# Patient Record
Sex: FEMALE | Race: BLACK OR AFRICAN AMERICAN
[De-identification: names, ages, dates, MRNs, and addresses within clinical notes are randomized per-mention and may not be internally consistent; named-entity substitution may affect disease eponyms.]

---

## 2019-02-24 ENCOUNTER — HOSPITAL ENCOUNTER (INPATIENT)
Dept: HOSPITAL 87 - ER | Age: 54
LOS: 2 days | Discharge: HOME | DRG: 133 | End: 2019-02-26
Attending: INTERNAL MEDICINE | Admitting: INTERNAL MEDICINE
Payer: MEDICAID

## 2019-02-24 VITALS — HEIGHT: 66 IN | BODY MASS INDEX: 32.84 KG/M2 | WEIGHT: 204.37 LBS

## 2019-02-24 DIAGNOSIS — J20.9: ICD-10-CM

## 2019-02-24 DIAGNOSIS — J44.1: ICD-10-CM

## 2019-02-24 DIAGNOSIS — J96.00: Primary | ICD-10-CM

## 2019-02-24 DIAGNOSIS — E78.5: ICD-10-CM

## 2019-02-24 DIAGNOSIS — Z90.710: ICD-10-CM

## 2019-02-24 DIAGNOSIS — J44.0: ICD-10-CM

## 2019-02-24 DIAGNOSIS — F17.210: ICD-10-CM

## 2019-02-24 DIAGNOSIS — J18.9: ICD-10-CM

## 2019-02-24 DIAGNOSIS — R65.10: ICD-10-CM

## 2019-02-24 DIAGNOSIS — Z71.6: ICD-10-CM

## 2019-02-24 LAB
BASOPHILS NFR BLD AUTO: 0.7 % (ref 0–2)
CHLORIDE SERPL-SCNC: 109 MEQ/L (ref 98–107)
EOSINOPHIL NFR BLD AUTO: 2.6 % (ref 0–5)
ERYTHROCYTE [DISTWIDTH] IN BLOOD BY AUTOMATED COUNT: 14.4 % (ref 11.6–14.6)
HCT VFR BLD AUTO: 39 % (ref 36–48)
HGB BLD-MCNC: 13.1 G/DL (ref 12–16)
LYMPHOCYTES NFR BLD AUTO: 57.9 % (ref 20–50)
MCH RBC QN AUTO: 32.2 PG (ref 28–32)
MCV RBC AUTO: 95.5 FL (ref 81–99)
MONOCYTES NFR BLD AUTO: 7.4 % (ref 2–8)
NEUTROPHILS NFR BLD AUTO: 31.4 % (ref 40–76)
PLATELET # BLD AUTO: 302 X1000/UL (ref 130–400)
PMV BLD AUTO: 8.4 FL (ref 7.4–10.4)
RBC # BLD AUTO: 4.08 MILL/UL (ref 4.2–5.4)

## 2019-02-24 PROCEDURE — 94640 AIRWAY INHALATION TREATMENT: CPT

## 2019-02-24 PROCEDURE — 36600 WITHDRAWAL OF ARTERIAL BLOOD: CPT

## 2019-02-24 PROCEDURE — 83036 HEMOGLOBIN GLYCOSYLATED A1C: CPT

## 2019-02-24 PROCEDURE — 83880 ASSAY OF NATRIURETIC PEPTIDE: CPT

## 2019-02-24 PROCEDURE — 82805 BLOOD GASES W/O2 SATURATION: CPT

## 2019-02-24 PROCEDURE — 80305 DRUG TEST PRSMV DIR OPT OBS: CPT

## 2019-02-24 PROCEDURE — 80061 LIPID PANEL: CPT

## 2019-02-24 PROCEDURE — 96375 TX/PRO/DX INJ NEW DRUG ADDON: CPT

## 2019-02-24 PROCEDURE — 84484 ASSAY OF TROPONIN QUANT: CPT

## 2019-02-24 PROCEDURE — 87804 INFLUENZA ASSAY W/OPTIC: CPT

## 2019-02-24 PROCEDURE — 93005 ELECTROCARDIOGRAM TRACING: CPT

## 2019-02-24 PROCEDURE — 36415 COLL VENOUS BLD VENIPUNCTURE: CPT

## 2019-02-24 PROCEDURE — 99285 EMERGENCY DEPT VISIT HI MDM: CPT

## 2019-02-24 PROCEDURE — 96374 THER/PROPH/DIAG INJ IV PUSH: CPT

## 2019-02-24 PROCEDURE — 71045 X-RAY EXAM CHEST 1 VIEW: CPT

## 2019-02-24 PROCEDURE — 82375 ASSAY CARBOXYHB QUANT: CPT

## 2019-02-24 PROCEDURE — 93970 EXTREMITY STUDY: CPT

## 2019-02-25 VITALS — DIASTOLIC BLOOD PRESSURE: 85 MMHG | SYSTOLIC BLOOD PRESSURE: 143 MMHG

## 2019-02-25 VITALS — DIASTOLIC BLOOD PRESSURE: 83 MMHG | SYSTOLIC BLOOD PRESSURE: 133 MMHG

## 2019-02-25 LAB
BASE EXCESS BLDA CALC-SCNC: -1.4 MMOL/L (ref -2–2)
COHGB MFR BLDA: 3.9 % (ref 0.5–1.5)
DO-HGB MFR BLDA: 8.3 % (ref 0–5)
HCO3 BLDA-SCNC: 23.8 MMOL/L (ref 22–26)
HDLC SERPL-MCNC: 53 MG/DL (ref 40–59)
HGB BLDA OXIMETRY-MCNC: 13.5 G/DL (ref 12–18)
INHALED O2 CONCENTRATION: 21 %
LDLC SERPL DIRECT ASSAY-MCNC: 148 MG/DL (ref 5–100)
METHGB MFR BLDA: 0.3 % (ref 0–1.5)
OXYHGB MFR BLDA: 87.5 % (ref 94–97)
PCO2 TEMP ADJ BLDA: 42 MMHG (ref 35–45)
PH TEMP ADJ BLDA: 7.37 [PH] (ref 7.35–7.45)
PO2 TEMP ADJ BLDA: 61.1 MMHG (ref 75–100)
SAO2 % BLDA: 91.3 % (ref 92–98.5)
SPECIMEN DRAWN FROM PATIENT: (no result)
VENTILATION MODE VENT: (no result)

## 2019-02-25 RX ADMIN — METHYLPREDNISOLONE SODIUM SUCCINATE SCH MG: 125 INJECTION, POWDER, FOR SOLUTION INTRAMUSCULAR; INTRAVENOUS at 18:21

## 2019-02-25 RX ADMIN — FLUTICASONE PROPIONATE SCH SPRAY: 50 SPRAY, METERED NASAL at 21:00

## 2019-02-25 RX ADMIN — GUAIFENESIN AND DEXTROMETHORPHAN HYDROBROMIDE SCH TAB: 600; 30 TABLET, EXTENDED RELEASE ORAL at 20:32

## 2019-02-25 RX ADMIN — AZITHROMYCIN SCH MG: 500 TABLET, FILM COATED ORAL at 20:31

## 2019-02-25 RX ADMIN — IPRATROPIUM BROMIDE AND ALBUTEROL SULFATE SCH ML: .5; 3 SOLUTION RESPIRATORY (INHALATION) at 14:03

## 2019-02-25 RX ADMIN — METHYLPREDNISOLONE SODIUM SUCCINATE SCH MG: 125 INJECTION, POWDER, FOR SOLUTION INTRAMUSCULAR; INTRAVENOUS at 10:30

## 2019-02-25 RX ADMIN — DILTIAZEM HYDROCHLORIDE SCH MG: 60 TABLET, FILM COATED ORAL at 18:22

## 2019-02-25 RX ADMIN — FAMOTIDINE SCH MG: 20 TABLET ORAL at 20:32

## 2019-02-25 RX ADMIN — LORATADINE SCH MG: 10 TABLET ORAL at 20:32

## 2019-02-26 VITALS — DIASTOLIC BLOOD PRESSURE: 78 MMHG | SYSTOLIC BLOOD PRESSURE: 135 MMHG

## 2019-02-26 VITALS — DIASTOLIC BLOOD PRESSURE: 79 MMHG | SYSTOLIC BLOOD PRESSURE: 130 MMHG

## 2019-02-26 VITALS — SYSTOLIC BLOOD PRESSURE: 111 MMHG | DIASTOLIC BLOOD PRESSURE: 81 MMHG

## 2019-02-26 VITALS — DIASTOLIC BLOOD PRESSURE: 79 MMHG | SYSTOLIC BLOOD PRESSURE: 129 MMHG

## 2019-02-26 LAB
AMPHETAMINES UR QL SCN: NEGATIVE
APPEARANCE UR: CLEAR
BARBITURATES UR QL SCN: NEGATIVE
BENZODIAZ UR QL SCN: NEGATIVE
BZE UR QL SCN: NEGATIVE
CANNABINOIDS UR QL SCN: NEGATIVE
COLOR UR: YELLOW
HGB UR QL STRIP: NEGATIVE
KETONES UR STRIP-MCNC: NEGATIVE MG/DL
LEUKOCYTE ESTERASE UR QL STRIP: NEGATIVE
METHADONE UR QL SCN: NEGATIVE
NITRITE UR QL STRIP: NEGATIVE
OPIATES UR QL SCN: (no result)
PCP UR QL SCN: NEGATIVE
PH UR STRIP: 6.5 [PH] (ref 4.5–8)
PROT UR QL STRIP: NEGATIVE
SP GR UR STRIP: 1.02 (ref 1–1.03)
UROBILINOGEN UR STRIP-MCNC: 0.2 E.U./DL (ref 0.2–1)

## 2019-02-26 RX ADMIN — FLUTICASONE PROPIONATE SCH SPRAY: 50 SPRAY, METERED NASAL at 11:45

## 2019-02-26 RX ADMIN — FAMOTIDINE SCH MG: 20 TABLET ORAL at 08:37

## 2019-02-26 RX ADMIN — DILTIAZEM HYDROCHLORIDE SCH MG: 60 TABLET, FILM COATED ORAL at 11:46

## 2019-02-26 RX ADMIN — DILTIAZEM HYDROCHLORIDE SCH MG: 60 TABLET, FILM COATED ORAL at 00:32

## 2019-02-26 RX ADMIN — LORATADINE SCH MG: 10 TABLET ORAL at 08:37

## 2019-02-26 RX ADMIN — IPRATROPIUM BROMIDE AND ALBUTEROL SULFATE SCH ML: .5; 3 SOLUTION RESPIRATORY (INHALATION) at 08:27

## 2019-02-26 RX ADMIN — IPRATROPIUM BROMIDE AND ALBUTEROL SULFATE SCH ML: .5; 3 SOLUTION RESPIRATORY (INHALATION) at 01:08

## 2019-02-26 RX ADMIN — Medication SCH MG: at 13:51

## 2019-02-26 RX ADMIN — GUAIFENESIN AND DEXTROMETHORPHAN HYDROBROMIDE SCH TAB: 600; 30 TABLET, EXTENDED RELEASE ORAL at 08:37

## 2019-02-26 RX ADMIN — DILTIAZEM HYDROCHLORIDE SCH MG: 60 TABLET, FILM COATED ORAL at 05:30

## 2019-02-26 RX ADMIN — IPRATROPIUM BROMIDE AND ALBUTEROL SULFATE SCH ML: .5; 3 SOLUTION RESPIRATORY (INHALATION) at 11:37

## 2019-02-26 RX ADMIN — IPRATROPIUM BROMIDE AND ALBUTEROL SULFATE SCH ML: .5; 3 SOLUTION RESPIRATORY (INHALATION) at 05:00

## 2019-02-26 RX ADMIN — Medication SCH MG: at 05:30

## 2019-02-26 RX ADMIN — AZITHROMYCIN SCH MG: 500 TABLET, FILM COATED ORAL at 08:37

## 2019-05-23 ENCOUNTER — HOSPITAL ENCOUNTER (EMERGENCY)
Dept: HOSPITAL 87 - ER | Age: 54
Discharge: HOME | End: 2019-05-23
Payer: MEDICAID

## 2019-05-23 VITALS — WEIGHT: 205.03 LBS | BODY MASS INDEX: 35 KG/M2 | HEIGHT: 64 IN

## 2019-05-23 VITALS — SYSTOLIC BLOOD PRESSURE: 129 MMHG | DIASTOLIC BLOOD PRESSURE: 81 MMHG

## 2019-05-23 DIAGNOSIS — S90.32XA: Primary | ICD-10-CM
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
## 2019-05-23 DIAGNOSIS — M25.561: ICD-10-CM
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
## 2019-05-23 PROCEDURE — 73560 X-RAY EXAM OF KNEE 1 OR 2: CPT

## 2019-05-23 PROCEDURE — 99283 EMERGENCY DEPT VISIT LOW MDM: CPT

## 2019-05-23 PROCEDURE — 73630 X-RAY EXAM OF FOOT: CPT

## 2019-06-12 ENCOUNTER — HOSPITAL ENCOUNTER (INPATIENT)
Dept: HOSPITAL 87 - ER | Age: 54
LOS: 2 days | Discharge: HOME | DRG: 140 | End: 2019-06-14
Attending: INTERNAL MEDICINE | Admitting: INTERNAL MEDICINE
Payer: MEDICAID

## 2019-06-12 VITALS — WEIGHT: 196 LBS | HEIGHT: 66 IN | BODY MASS INDEX: 31.5 KG/M2

## 2019-06-12 DIAGNOSIS — E66.9: ICD-10-CM

## 2019-06-12 DIAGNOSIS — Z71.3: ICD-10-CM

## 2019-06-12 DIAGNOSIS — E87.8: ICD-10-CM

## 2019-06-12 DIAGNOSIS — Z85.41: ICD-10-CM

## 2019-06-12 DIAGNOSIS — Z71.6: ICD-10-CM

## 2019-06-12 DIAGNOSIS — Z90.710: ICD-10-CM

## 2019-06-12 DIAGNOSIS — F17.210: ICD-10-CM

## 2019-06-12 DIAGNOSIS — J44.1: Primary | ICD-10-CM

## 2019-06-12 DIAGNOSIS — G90.8: ICD-10-CM

## 2019-06-12 LAB
BASOPHILS NFR BLD AUTO: 0.3 % (ref 0–2)
CHLORIDE SERPL-SCNC: 109 MEQ/L (ref 98–107)
EOSINOPHIL NFR BLD AUTO: 1.3 % (ref 0–5)
ERYTHROCYTE [DISTWIDTH] IN BLOOD BY AUTOMATED COUNT: 14.2 % (ref 11.6–14.6)
HCT VFR BLD AUTO: 37.2 % (ref 36–48)
HGB BLD-MCNC: 12.7 G/DL (ref 12–16)
LYMPHOCYTES NFR BLD AUTO: 41 % (ref 20–50)
MCH RBC QN AUTO: 32.7 PG (ref 28–32)
MCV RBC AUTO: 95.7 FL (ref 81–99)
MONOCYTES NFR BLD AUTO: 8.3 % (ref 2–8)
NEUTROPHILS NFR BLD AUTO: 49.1 % (ref 40–76)
PLATELET # BLD AUTO: 276 X1000/UL (ref 130–400)
PMV BLD AUTO: 7.9 FL (ref 7.4–10.4)
RBC # BLD AUTO: 3.89 MILL/UL (ref 4.2–5.4)

## 2019-06-12 PROCEDURE — 94640 AIRWAY INHALATION TREATMENT: CPT

## 2019-06-12 PROCEDURE — 96372 THER/PROPH/DIAG INJ SC/IM: CPT

## 2019-06-12 PROCEDURE — 96375 TX/PRO/DX INJ NEW DRUG ADDON: CPT

## 2019-06-12 PROCEDURE — 93005 ELECTROCARDIOGRAM TRACING: CPT

## 2019-06-12 PROCEDURE — 96365 THER/PROPH/DIAG IV INF INIT: CPT

## 2019-06-12 PROCEDURE — 99285 EMERGENCY DEPT VISIT HI MDM: CPT

## 2019-06-12 PROCEDURE — 84484 ASSAY OF TROPONIN QUANT: CPT

## 2019-06-12 PROCEDURE — 71045 X-RAY EXAM CHEST 1 VIEW: CPT

## 2019-06-12 PROCEDURE — 80305 DRUG TEST PRSMV DIR OPT OBS: CPT

## 2019-06-12 PROCEDURE — 83880 ASSAY OF NATRIURETIC PEPTIDE: CPT

## 2019-06-12 PROCEDURE — 96367 TX/PROPH/DG ADDL SEQ IV INF: CPT

## 2019-06-12 PROCEDURE — 36415 COLL VENOUS BLD VENIPUNCTURE: CPT

## 2019-06-13 VITALS — DIASTOLIC BLOOD PRESSURE: 67 MMHG | SYSTOLIC BLOOD PRESSURE: 124 MMHG

## 2019-06-13 VITALS — DIASTOLIC BLOOD PRESSURE: 89 MMHG | SYSTOLIC BLOOD PRESSURE: 140 MMHG

## 2019-06-13 VITALS — DIASTOLIC BLOOD PRESSURE: 63 MMHG | SYSTOLIC BLOOD PRESSURE: 91 MMHG

## 2019-06-13 VITALS — DIASTOLIC BLOOD PRESSURE: 84 MMHG | SYSTOLIC BLOOD PRESSURE: 124 MMHG

## 2019-06-13 VITALS — SYSTOLIC BLOOD PRESSURE: 124 MMHG | DIASTOLIC BLOOD PRESSURE: 67 MMHG

## 2019-06-13 VITALS — DIASTOLIC BLOOD PRESSURE: 101 MMHG | SYSTOLIC BLOOD PRESSURE: 123 MMHG

## 2019-06-13 VITALS — DIASTOLIC BLOOD PRESSURE: 79 MMHG | SYSTOLIC BLOOD PRESSURE: 137 MMHG

## 2019-06-13 RX ADMIN — Medication SCH MG: at 23:17

## 2019-06-13 RX ADMIN — LACTULOSE SCH ML: 10 SOLUTION ORAL at 22:26

## 2019-06-13 RX ADMIN — NICOTINE SCH MG: 21 PATCH, EXTENDED RELEASE TRANSDERMAL at 00:13

## 2019-06-13 RX ADMIN — ENOXAPARIN SODIUM SCH MG: 100 INJECTION SUBCUTANEOUS at 02:05

## 2019-06-13 RX ADMIN — IPRATROPIUM BROMIDE AND ALBUTEROL SULFATE SCH ML: .5; 3 SOLUTION RESPIRATORY (INHALATION) at 21:12

## 2019-06-13 RX ADMIN — LACTULOSE SCH ML: 10 SOLUTION ORAL at 14:48

## 2019-06-13 RX ADMIN — Medication SCH MG: at 00:12

## 2019-06-13 RX ADMIN — Medication SCH MG: at 14:48

## 2019-06-13 RX ADMIN — ZOLPIDEM TARTRATE PRN MG: 5 TABLET ORAL at 00:21

## 2019-06-13 RX ADMIN — ZOLPIDEM TARTRATE PRN MG: 5 TABLET ORAL at 23:17

## 2019-06-13 RX ADMIN — NICOTINE SCH MG: 21 PATCH, EXTENDED RELEASE TRANSDERMAL at 09:34

## 2019-06-13 RX ADMIN — FLUTICASONE PROPIONATE SCH SPRAY: 50 SPRAY, METERED NASAL at 21:13

## 2019-06-13 RX ADMIN — Medication SCH MG: at 06:57

## 2019-06-13 RX ADMIN — IPRATROPIUM BROMIDE AND ALBUTEROL SULFATE SCH ML: .5; 3 SOLUTION RESPIRATORY (INHALATION) at 08:00

## 2019-06-13 RX ADMIN — FLUTICASONE PROPIONATE SCH SPRAY: 50 SPRAY, METERED NASAL at 09:35

## 2019-06-13 RX ADMIN — IPRATROPIUM BROMIDE AND ALBUTEROL SULFATE SCH ML: .5; 3 SOLUTION RESPIRATORY (INHALATION) at 14:49

## 2019-06-13 RX ADMIN — GUAIFENESIN SCH MG: 600 TABLET, EXTENDED RELEASE ORAL at 21:13

## 2019-06-14 VITALS — DIASTOLIC BLOOD PRESSURE: 79 MMHG | SYSTOLIC BLOOD PRESSURE: 161 MMHG

## 2019-06-14 VITALS — DIASTOLIC BLOOD PRESSURE: 75 MMHG | SYSTOLIC BLOOD PRESSURE: 123 MMHG

## 2019-06-14 VITALS — SYSTOLIC BLOOD PRESSURE: 121 MMHG | DIASTOLIC BLOOD PRESSURE: 83 MMHG

## 2019-06-14 VITALS — SYSTOLIC BLOOD PRESSURE: 130 MMHG | DIASTOLIC BLOOD PRESSURE: 85 MMHG

## 2019-06-14 VITALS — SYSTOLIC BLOOD PRESSURE: 116 MMHG | DIASTOLIC BLOOD PRESSURE: 79 MMHG

## 2019-06-14 LAB
AMPHETAMINES UR QL SCN: NEGATIVE
BARBITURATES UR QL SCN: NEGATIVE
BENZODIAZ UR QL SCN: NEGATIVE
BZE UR QL SCN: NEGATIVE
CANNABINOIDS UR QL SCN: NEGATIVE
METHADONE UR QL SCN: NEGATIVE
OPIATES UR QL SCN: NEGATIVE
PCP UR QL SCN: NEGATIVE

## 2019-06-14 RX ADMIN — Medication SCH MG: at 06:50

## 2019-06-14 RX ADMIN — GUAIFENESIN SCH MG: 600 TABLET, EXTENDED RELEASE ORAL at 08:54

## 2019-06-14 RX ADMIN — NICOTINE SCH MG: 21 PATCH, EXTENDED RELEASE TRANSDERMAL at 08:54

## 2019-06-14 RX ADMIN — IPRATROPIUM BROMIDE AND ALBUTEROL SULFATE SCH ML: .5; 3 SOLUTION RESPIRATORY (INHALATION) at 00:31

## 2019-06-14 RX ADMIN — IPRATROPIUM BROMIDE AND ALBUTEROL SULFATE SCH ML: .5; 3 SOLUTION RESPIRATORY (INHALATION) at 08:02

## 2019-06-14 RX ADMIN — FLUTICASONE PROPIONATE SCH SPRAY: 50 SPRAY, METERED NASAL at 08:53

## 2019-06-14 RX ADMIN — ENOXAPARIN SODIUM SCH MG: 100 INJECTION SUBCUTANEOUS at 08:53

## 2019-06-14 RX ADMIN — IPRATROPIUM BROMIDE AND ALBUTEROL SULFATE SCH ML: .5; 3 SOLUTION RESPIRATORY (INHALATION) at 04:39

## 2019-06-14 RX ADMIN — LACTULOSE SCH ML: 10 SOLUTION ORAL at 06:19

## 2019-11-18 ENCOUNTER — HOSPITAL ENCOUNTER (EMERGENCY)
Dept: HOSPITAL 72 - EMR | Age: 54
Discharge: HOME | End: 2019-11-18
Payer: COMMERCIAL

## 2019-11-18 VITALS — DIASTOLIC BLOOD PRESSURE: 90 MMHG | SYSTOLIC BLOOD PRESSURE: 132 MMHG

## 2019-11-18 VITALS — BODY MASS INDEX: 32.49 KG/M2 | HEIGHT: 65 IN | WEIGHT: 195 LBS

## 2019-11-18 VITALS — DIASTOLIC BLOOD PRESSURE: 78 MMHG | SYSTOLIC BLOOD PRESSURE: 128 MMHG

## 2019-11-18 DIAGNOSIS — S70.02XA: ICD-10-CM

## 2019-11-18 DIAGNOSIS — I10: ICD-10-CM
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
## 2019-11-18 DIAGNOSIS — J44.9: ICD-10-CM

## 2019-11-18 DIAGNOSIS — S40.012A: ICD-10-CM

## 2019-11-18 DIAGNOSIS — Z90.710: ICD-10-CM

## 2019-11-18 DIAGNOSIS — S46.912A: Primary | ICD-10-CM
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
## 2019-11-18 PROCEDURE — 73510: CPT

## 2019-11-18 PROCEDURE — 99284 EMERGENCY DEPT VISIT MOD MDM: CPT

## 2019-11-18 PROCEDURE — 72192 CT PELVIS W/O DYE: CPT

## 2019-11-18 PROCEDURE — 73030 X-RAY EXAM OF SHOULDER: CPT

## 2019-11-18 PROCEDURE — 73502 X-RAY EXAM HIP UNI 2-3 VIEWS: CPT

## 2019-11-18 NOTE — DIAGNOSTIC IMAGING REPORT
Indication: Left hip pain

 

Technique: 2 views of the left hip

 

Comparison: none

 

Findings: There is slight irregularity of the superior acetabulum on the left. No

acute hip fracture demonstrated.

 

Impression: Possible left acetabular fracture. Consider cross-sectional imaging for

further evaluation

## 2019-11-18 NOTE — EMERGENCY ROOM REPORT
History of Present Illness


General


Chief Complaint:  Upper Extremity Injury


Source:  Patient





Present Illness


HPI


54-year-old female presents to the emergency department complaining of 7 out of 

10 severity pain to the left shoulder and left posterior hip status post 

mechanical fall down some stairs on Saturday.  Patient reports she has 

attempted to relieve her pain by taking Motrin and Epson salt baths.  Patient 

reports despite these efforts she has not been able to experience any pain 

relief.  She denies hitting her head or having a loss of consciousness.  She 

denies midline neck or back pain.  Denies numbness tingling or loss of 

sensation or gross motor movements of the extremities, incontinence of bowel or 

bladder. Denies CP, Palpitations, LOC, AMS, dizziness, Changes in Vision, 

weakness or a sudden severe headache. Pmhx of COPD. Denies SOB or dyspnea.


Allergies:  


Coded Allergies:  


     No Known Allergies (Unverified , 10/14/12)





Patient History


Past Medical History:  see triage record, COPD


Past Surgical History:  hysterectomy


Pertinent Family History:  none


Last Menstrual Period:  HYSTERECTOMY


Pregnant Now:  No


Reviewed Nursing Documentation:  PMH: Agreed; PSxH: Agreed





Nursing Documentation-PMH


Past Medical History:  No History, Except For


Hx Cardiac Problems:  No


Hx Hypertension:  Yes


Hx Pacemaker:  No


Hx Asthma:  Yes


Hx COPD:  Yes - BRONCHITIS


Hx Diabetes:  No


Hx Cancer:  No


Hx Gastrointestinal Problems:  No


Hx Dialysis:  No


Hx Neurological Problems:  No


Hx Cerebrovascular Accident:  No


Hx Seizures:  No





Review of Systems


All Other Systems:  negative except mentioned in HPI





Physical Exam





Vital Signs








  Date Time  Temp Pulse Resp B/P (MAP) Pulse Ox O2 Delivery O2 Flow Rate FiO2


 


11/18/19 14:35 98.2 94 16 132/90 (104) 100 Room Air  








Sp02 EP Interpretation:  reviewed, normal


General Appearance:  no apparent distress, alert, GCS 15, non-toxic


Head:  normocephalic, atraumatic


Eyes:  bilateral eye normal inspection, bilateral eye PERRL


ENT:  hearing grossly normal, normal voice


Neck:  full range of motion, no bony tend


Respiratory:  chest non-tender, lungs clear, normal breath sounds, no rhonchi, 

no wheezing, speaking full sentences


Cardiovascular #1:  regular rate, rhythm, no edema, normal capillary refill


Musculoskeletal:  back normal, gait/station normal, normal range of motion, 

tender - lateral and posterior TTP to the left hip and gluteus, normal leg 

length bilaterally. ambulatory. TTP to the posterior and lateral aspect of the 

left shoulder, FROM with pain, no obvious step-off or clicking.


Neurologic:  alert, oriented x3, responsive, motor strength/tone normal, 

sensory intact, normal gait, speech normal, grossly normal


Psychiatric:  judgement/insight normal


Skin:  normal color





Medical Decision Making


PA Attestation


Dr. Freitas is my supervising Physician whom patient management has been 

discussed with.


Diagnostic Impression:  


 Primary Impression:  


 Contusion of hip, left


 Qualified Codes:  S70.02XA - Contusion of left hip, initial encounter


 Additional Impressions:  


 Left shoulder strain


 Qualified Codes:  S46.912A - Strain of unspecified muscle, fascia and tendon 

at shoulder and upper arm level, left arm, initial encounter


 Contusion of shoulder, left


 Qualified Codes:  S40.012A - Contusion of left shoulder, initial encounter


ER Course


54-year-old female presents to the emergency department complaining of 7 out of 

10 severity pain to the left shoulder and left posterior hip status post 

mechanical fall down some stairs on Saturday.  Patient reports she has 

attempted to relieve her pain by taking Motrin and Epson salt baths.  Patient 

reports despite these efforts she has not been able to experience any pain 

relief.  She denies hitting her head or having a loss of consciousness.  She 

denies midline neck or back pain.  Denies numbness tingling or loss of 

sensation or gross motor movements of the extremities, incontinence of bowel or 

bladder. Denies CP, Palpitations, LOC, AMS, dizziness, Changes in Vision, 

weakness or a sudden severe headache. Pmhx of COPD. Denies SOB or dyspnea.





Ddx considered but are not limited to Fracture, dislocation, contusion,  Sprain/

Strain/Spasm, spinal chord injury just to name a few.





Vital signs: are WNL, pt. is afebrile


H&PE are most consistent with musculoskeletal injury will perform imaging to r/

o fractures/dislocations. 





ORDERS:





-  X-ray Left Hip 3 views and Left Shoulder 3 Views   - negative for fx, 

Dislocation, or significant soft tissue injury, per preliminary read in ED, and 

signed by  JAZLYN Santo, my supervising physician has reviewed, and agrees 

with my interpretation.








--Radiologist reports questionable acetabular fracture, and recommend CT 

imaging for definitive diagnosis.





CT Pelvis: negative for acute fracture.





ED INTERVENTIONS:





-  Tylenol 1g


- Lidoderm tp.








DISCHARGE: At this time pt. is stable for d/c to home. Will provide printed 

patient care instructions, and any necessary prescriptions. Care plan and 

follow up instructions have been discussed with the patient prior to discharge.


Other X-Ray Diagnostic Results


Other X-Ray Diagnostic Results #1:  


   X-Ray ordered:  Left HIP


   # of Views/Limited Vs Complete:  3 View


   Indication:  Pain


   EP Interpretation:  Yes


   PA Xray:  Interpretation reviewed, by supervising MD, and agrees with 

findings.


   Interpretation:  no dislocation, no soft tissue swelling, no fractures


   Impression:  No acute disease


   Electronically Signed by:  Keke Santo PA-C


Other X-Ray Diagnostic Results #2:  


   X-Ray ordered:  Left Shoulder


   # of Views/Limited Vs Complete:  3 View


   Indication:  Pain


   EP Interpretation:  Yes


   PA Xray:  Interpretation reviewed, by supervising MD, and agrees with 

findings.


   Interpretation:  no dislocation, no soft tissue swelling, no fractures


   Impression:  No acute disease


   Electronically Signed by:  Keke Santo PA-C





CT/MRI/US Diagnostic Results


CT/MRI/US Diagnostic Results :  


   Imaging Test Ordered:  CT Pelvis  No-Contrast


   Impression


" NO acute fractures " per official radiology report- Please see report for 

specific details.





Last Vital Signs








  Date Time  Temp Pulse Resp B/P (MAP) Pulse Ox O2 Delivery O2 Flow Rate FiO2


 


11/18/19 14:40 98.2 80 16 132/90 100 Room Air  








Disposition:  HOME, SELF-CARE


Condition:  Stable


Scripts


Acetaminophen With Codeine (T#3) (TYLENOL #3 TAB*) Y Tab


1 TAB ORAL Q6H PRN for For Pain, #12 TAB


   Prov: Keke Santo         11/18/19


Referrals:  


H CLAUDE HUDSON,REFERRING (PCP)


Departure Forms:  Return to Work      Return to Work Date:  Nov 22, 2019


   Work Restrictions:  No Heavy Lifting, No Prolonged Standing


   Other Restrictions:  light duty.  May return Sooner if Symptoms have 

resolved. 


   Return to Full Activity:  Nov 29, 2019


Patient Instructions:  Contusion, Easy-to-Read





Additional Instructions:  


Take medications as directed. 


 ** Follow up with a Primary Care Provider in 3-5 days, even if your symptoms 

have resolved. ** 


--Please review list of primary care clinics, if you do not already have a 

primary care provider





Return sooner to ED if new symptoms occur, or current symptoms become worse. 


Do not drink alcohol, drive, or operate heavy machinery while taking [ ] as 

this may cause drowsiness. 











- Please note that this Emergency Department Report was dictated using [employer] technology software, occasionally this can lead to 

erroneous entry secondary to interpretation by the dictation equipment.











Keke Santo Nov 18, 2019 15:20

## 2019-11-18 NOTE — NUR
ED Nurse Note:



Patient walked in to ER from home due to Lt shoulder and Lt hip pain 7/10. per 
pt, she sliped and fell from the stairs on Saturday and pain got worse today. 
Patient alert and oriented x4 and ambulatory but limping. Calm and cooperative. 
Skin clean and intact. No cardiac or acute distress noted at this time.

## 2019-11-18 NOTE — NUR
ED Nurse Note:



Pt cleared by health care Provider for discharge. DC instructions/ electronic 
prescription was given and explained to pt and verbalized understanding of 
teachings. All medical deviecs such as ID band  removed. Pt is AAO x4, 
ambulatory and left with all personal belongings.

## 2019-11-18 NOTE — DIAGNOSTIC IMAGING REPORT
Indication: Left hip pain, trauma

 

Technique: Noncontrast spiral acquisitions obtained through the pelvis. Multiplanar

reconstructions generated. Total dose length product 1184 mGycm. CTDIvol(s) 31 mGy. 

Dose reduction achieved using automated exposure control

 

 

Comparison: none

 

Findings: No acute fracture demonstrated. Lucency reported on recent plain radiograph

appears to be a physiologic groove. The joint spaces are preserved. There are

degenerative changes of the lumbosacral junction incidentally noted.

 

There is a lucency within the left L4 lamina which measures 11 x 5 mm.

 

Uterus is absent. The included pelvic viscera are otherwise unremarkable. There is an

18 mm subcutaneous nodule seen in the inferomedial right buttock

 

Impression: No acute bony trauma

 

18 mm nodule in the right buttock within the subcutaneous fat adjacent to the dermis,

likely sebaceous or epidermal inclusion cyst or similar process. Correlate with

clinical findings

 

Prior history

 

The above findings are in agreement with the [facility] preliminary report

 

11 x 5 mm lucency within the left L4 lamina. Further evaluation with bone scan should

be considered to rule out metabolically active process. This finding was not reported

on the [facility] preliminary report, was described by phone with Dr. Chew at the

time of interpretation

 

 

 

The CT scanner at [facility] is accredited by the American College of

Radiology and the scans are performed using protocols designed to limit radiation

exposure to as low as reasonably achievable to attain images of sufficient resolution

adequate for diagnostic evaluation.

## 2019-11-18 NOTE — DIAGNOSTIC IMAGING REPORT
Indication: Left shoulder pain

 

Technique: 3 views of the left shoulder

 

Comparison: none

 

Findings: No acute fractures. No dislocations. The joint spaces are preserved. There

is an old healed fracture deformity of the posterior left fifth rib

 

Impression: No acute bony trauma

## 2020-01-07 ENCOUNTER — HOSPITAL ENCOUNTER (EMERGENCY)
Dept: HOSPITAL 72 - EMR | Age: 55
Discharge: HOME | End: 2020-01-07
Payer: COMMERCIAL

## 2020-01-07 VITALS — DIASTOLIC BLOOD PRESSURE: 77 MMHG | SYSTOLIC BLOOD PRESSURE: 130 MMHG

## 2020-01-07 VITALS — DIASTOLIC BLOOD PRESSURE: 79 MMHG | SYSTOLIC BLOOD PRESSURE: 132 MMHG

## 2020-01-07 VITALS — WEIGHT: 190 LBS | BODY MASS INDEX: 30.53 KG/M2 | HEIGHT: 66 IN

## 2020-01-07 DIAGNOSIS — Z85.41: ICD-10-CM

## 2020-01-07 DIAGNOSIS — F17.200: ICD-10-CM

## 2020-01-07 DIAGNOSIS — J44.1: Primary | ICD-10-CM

## 2020-01-07 DIAGNOSIS — I10: ICD-10-CM

## 2020-01-07 DIAGNOSIS — Z90.710: ICD-10-CM

## 2020-01-07 PROCEDURE — 99284 EMERGENCY DEPT VISIT MOD MDM: CPT

## 2020-01-07 NOTE — EMERGENCY ROOM REPORT
History of Present Illness


General


Chief Complaint:  Upper Respiratory Illness


Source:  Patient





Present Illness


HPI


54-year-old female presents to the emergency department complaining of 8/10 in 

severity cough, fevers and chills, body aches and mucus production x3 days.  

Patient reports her cough and wheezing has progressed over the last day as she 

is now out of her albuterol inhaler at home.  Patient reports that she is a 

daily smoker she has attempted to reduce her smoking frequency for the duration 

of the cold.  Patient states that she received the flu vaccination this year 

however every year she always gets a URI.  Patient denies recent travel or ill 

contacts.  Denies sore throat , ear pain CP, Palpitations, LOC, AMS, dizziness, 

Changes in Vision, Sensation, paresthesias, or a sudden severe headache.


Allergies:  


Coded Allergies:  


     No Known Allergies (Unverified , 10/14/12)





Patient History


Past Medical History:  see triage record


Past Surgical History:  none


Pertinent Family History:  none


Last Menstrual Period:  none-hyst


Pregnant Now:  No


Immunizations:  UTD


Reviewed Nursing Documentation:  PMH: Agreed; PSxH: Agreed





Nursing Documentation-PMH


Hx Cardiac Problems:  No


Hx Hypertension:  Yes


Hx Pacemaker:  No


Hx Asthma:  Yes


Hx COPD:  Yes - BRONCHITIS


Hx Diabetes:  No


Hx Cancer:  Yes - cervical


Hx Gastrointestinal Problems:  No


Hx Dialysis:  No


Hx Neurological Problems:  No


Hx Cerebrovascular Accident:  No


Hx Seizures:  No





Review of Systems


All Other Systems:  negative except mentioned in HPI





Physical Exam





Vital Signs








  Date Time  Temp Pulse Resp B/P (MAP) Pulse Ox O2 Delivery O2 Flow Rate FiO2


 


1/7/20 15:58 99.7 108 24 130/77 (94) 97 Room Air  








Sp02 EP Interpretation:  reviewed, normal


General Appearance:  no apparent distress, alert, GCS 15, non-toxic


Head:  normocephalic, atraumatic


Eyes:  bilateral eye normal inspection, bilateral eye PERRL


ENT:  hearing grossly normal, EOM grossly intact, normal pharynx, normal voice, 

TMs + canals normal, uvula midline, moist mucus membranes


Neck:  full range of motion


Respiratory:  chest non-tender, speaking full sentences, wheezing - expiratory


Cardiovascular #1:  regular rate, rhythm


Musculoskeletal:  normal range of motion, gait/station normal, non-tender


Neurologic:  alert, motor strength/tone normal, oriented x3, sensory intact, 

responsive, speech normal


Psychiatric:  judgement/insight normal


Lymphatic:  no adenopathy





Medical Decision Making


PA Attestation


Dr. Chew is my supervising Physician whom patient management has been 

discussed with.


Diagnostic Impression:  


 Primary Impression:  


 COPD exacerbation


ER Course


54-year-old female presents to the emergency department complaining of 8/10 in 

severity cough, fevers and chills, body aches and mucus production x3 days.  

Patient reports her cough and wheezing has progressed over the last day as she 

is now out of her albuterol inhaler at home.  Patient reports that she is a 

daily smoker she has attempted to reduce her smoking frequency for the duration 

of the cold.  Patient states that she received the flu vaccination this year 

however every year she always gets a URI.  Patient denies recent travel or ill 

contacts.  Denies sore throat , ear pain CP, Palpitations, LOC, AMS, dizziness, 

Changes in Vision, Sensation, paresthesias, or a sudden severe headache.





Ddx considered but are not limited to asthma exacerbation, CHF, URI, pneumonia, 

PE, strep pharyngitis, meningitis.





Vital signs: Pt. is afebrile,  VS are WNL


H&PE are most consistent with  COPD exacerbation secondary to URI





ORDERS: none required at this time, the diagnosis is clinical





ED INTERVENTIONS: 





Albuterol nebulized treatment. 


- re-examination post nebulized treatment lungs are CTA bilaterally. 





DISCHARGE: At this time pt. is stable for d/c to home. Will provide printed 

patient care instructions, and any necessary prescriptions. Care plan and 

follow up instructions have been discussed with the patient prior to discharge.





Last Vital Signs








  Date Time  Temp Pulse Resp B/P (MAP) Pulse Ox O2 Delivery O2 Flow Rate FiO2


 


1/7/20 16:17 99.7 104 24 130/77 97 Room Air  








Disposition:  HOME, SELF-CARE


Condition:  Stable


Scripts


Guaifenesin (Mucinex) 1,200 Mg Tab.er.12h


1200 MG PO Q12HR for 10 Days, #20 TAB


   Prov: Keke Santo         1/7/20 


Albuterol Sulfate* (ALBUTEROL SULFATE MDI*) 8.5 Gm Hfa.aer.ad


2 PUFF INH Q3H, #1 INH 0 Refills


   Prov: Keke Santo         1/7/20 


Levofloxacin* (LEVAQUIN*) 500 Mg Tablet


500 MG ORAL DAILY for 7 Days, #7 TAB


   Prov: Keke Santo         1/7/20 


Codeine/Promethazine Hcl* (PROMETHAZINE-CODEINE SYRUP*) 118 Ml Syrup


5 ML ORAL Q6H PRN for For Cough, #120 ML 0 Refills


   Prov: Keke Santo         1/7/20


Patient Instructions:  Upper Respiratory Infection, Adult





Additional Instructions:  


Take medications as directed. 


 ** Follow up with a Primary Care Provider in 3-5 days, even if your symptoms 

have resolved. ** 





Return sooner to ED if new symptoms occur, or current symptoms become worse. 


Do not drink alcohol, drive, or operate heavy machinery while taking Cough 

Syrup as this may cause drowsiness. 











- Please note that this Emergency Department Report was dictated using [employer] technology software, occasionally this can lead to 

erroneous entry secondary to interpretation by the dictation equipment.











Keke Santo Jan 7, 2020 17:28

## 2020-03-23 ENCOUNTER — HOSPITAL ENCOUNTER (EMERGENCY)
Dept: HOSPITAL 87 - ER | Age: 55
LOS: 1 days | Discharge: HOME | End: 2020-03-24
Payer: MEDICAID

## 2020-03-23 VITALS — HEIGHT: 65 IN | BODY MASS INDEX: 31.37 KG/M2 | WEIGHT: 188.27 LBS

## 2020-03-23 VITALS — SYSTOLIC BLOOD PRESSURE: 126 MMHG | DIASTOLIC BLOOD PRESSURE: 81 MMHG

## 2020-03-23 DIAGNOSIS — Z90.710: ICD-10-CM

## 2020-03-23 DIAGNOSIS — Z87.01: ICD-10-CM

## 2020-03-23 DIAGNOSIS — J44.1: Primary | ICD-10-CM

## 2020-03-23 LAB
BASOPHILS NFR BLD AUTO: 0.5 % (ref 0–2)
CHLORIDE SERPL-SCNC: 108 MEQ/L (ref 98–107)
EOSINOPHIL NFR BLD AUTO: 1.9 % (ref 0–5)
ERYTHROCYTE [DISTWIDTH] IN BLOOD BY AUTOMATED COUNT: 14.4 % (ref 11.6–14.6)
HCT VFR BLD AUTO: 39.5 % (ref 36–48)
HGB BLD-MCNC: 13.4 G/DL (ref 12–16)
LYMPHOCYTES NFR BLD AUTO: 58.1 % (ref 20–50)
MCH RBC QN AUTO: 31.6 PG (ref 28–32)
MCV RBC AUTO: 92.8 FL (ref 81–99)
MONOCYTES NFR BLD AUTO: 8.2 % (ref 2–8)
NEUTROPHILS NFR BLD AUTO: 31.3 % (ref 40–76)
PLATELET # BLD AUTO: 284 X1000/UL (ref 130–400)
PMV BLD AUTO: 8.2 FL (ref 7.4–10.4)
RBC # BLD AUTO: 4.26 MILL/UL (ref 4.2–5.4)

## 2020-03-23 PROCEDURE — 83880 ASSAY OF NATRIURETIC PEPTIDE: CPT

## 2020-03-23 PROCEDURE — 87804 INFLUENZA ASSAY W/OPTIC: CPT

## 2020-03-23 PROCEDURE — 36415 COLL VENOUS BLD VENIPUNCTURE: CPT

## 2020-03-23 PROCEDURE — 94640 AIRWAY INHALATION TREATMENT: CPT

## 2020-03-23 PROCEDURE — 71045 X-RAY EXAM CHEST 1 VIEW: CPT

## 2020-03-23 PROCEDURE — 84484 ASSAY OF TROPONIN QUANT: CPT

## 2020-03-23 PROCEDURE — 93005 ELECTROCARDIOGRAM TRACING: CPT

## 2020-03-23 PROCEDURE — 80053 COMPREHEN METABOLIC PANEL: CPT

## 2020-03-23 PROCEDURE — 99285 EMERGENCY DEPT VISIT HI MDM: CPT

## 2020-03-23 PROCEDURE — 85025 COMPLETE CBC W/AUTO DIFF WBC: CPT

## 2020-05-22 ENCOUNTER — HOSPITAL ENCOUNTER (EMERGENCY)
Dept: HOSPITAL 87 - ER | Age: 55
Discharge: HOME | End: 2020-05-22
Payer: MEDICAID

## 2020-05-22 VITALS — WEIGHT: 189.6 LBS | HEIGHT: 66 IN | BODY MASS INDEX: 30.47 KG/M2

## 2020-05-22 VITALS — SYSTOLIC BLOOD PRESSURE: 132 MMHG | DIASTOLIC BLOOD PRESSURE: 78 MMHG

## 2020-05-22 DIAGNOSIS — Z85.9: ICD-10-CM

## 2020-05-22 DIAGNOSIS — J44.9: ICD-10-CM

## 2020-05-22 DIAGNOSIS — Z87.01: ICD-10-CM

## 2020-05-22 DIAGNOSIS — J06.9: Primary | ICD-10-CM

## 2020-05-22 DIAGNOSIS — R06.02: ICD-10-CM

## 2020-05-22 DIAGNOSIS — Z90.710: ICD-10-CM

## 2020-05-22 LAB
BASOPHILS NFR BLD AUTO: 0.4 % (ref 0–2)
CHLORIDE SERPL-SCNC: 109 MEQ/L (ref 98–107)
EOSINOPHIL NFR BLD AUTO: 2 % (ref 0–5)
ERYTHROCYTE [DISTWIDTH] IN BLOOD BY AUTOMATED COUNT: 15.1 % (ref 11.6–14.6)
HCT VFR BLD AUTO: 40.6 % (ref 36–48)
HGB BLD-MCNC: 13.9 G/DL (ref 12–16)
LYMPHOCYTES NFR BLD AUTO: 48.7 % (ref 20–50)
MCH RBC QN AUTO: 32.4 PG (ref 28–32)
MCV RBC AUTO: 94.4 FL (ref 81–99)
MONOCYTES NFR BLD AUTO: 8.5 % (ref 2–8)
NEUTROPHILS NFR BLD AUTO: 40.4 % (ref 40–76)
PLATELET # BLD AUTO: 257 X1000/UL (ref 130–400)
PMV BLD AUTO: 8.5 FL (ref 7.4–10.4)
RBC # BLD AUTO: 4.3 MILL/UL (ref 4.2–5.4)

## 2020-05-22 PROCEDURE — 83880 ASSAY OF NATRIURETIC PEPTIDE: CPT

## 2020-05-22 PROCEDURE — 93005 ELECTROCARDIOGRAM TRACING: CPT

## 2020-05-22 PROCEDURE — 99285 EMERGENCY DEPT VISIT HI MDM: CPT

## 2020-05-22 PROCEDURE — 84484 ASSAY OF TROPONIN QUANT: CPT

## 2020-05-22 PROCEDURE — 85025 COMPLETE CBC W/AUTO DIFF WBC: CPT

## 2020-05-22 PROCEDURE — 36415 COLL VENOUS BLD VENIPUNCTURE: CPT

## 2020-05-22 PROCEDURE — 71045 X-RAY EXAM CHEST 1 VIEW: CPT

## 2020-05-22 PROCEDURE — 80053 COMPREHEN METABOLIC PANEL: CPT

## 2021-04-27 ENCOUNTER — HOSPITAL ENCOUNTER (INPATIENT)
Dept: HOSPITAL 87 - ER | Age: 56
LOS: 3 days | Discharge: HOME | DRG: 182 | End: 2021-04-30
Attending: INTERNAL MEDICINE | Admitting: INTERNAL MEDICINE
Payer: MEDICAID

## 2021-04-27 VITALS — HEIGHT: 66 IN | BODY MASS INDEX: 33.35 KG/M2 | WEIGHT: 207.5 LBS

## 2021-04-27 DIAGNOSIS — Z90.710: ICD-10-CM

## 2021-04-27 DIAGNOSIS — E87.8: ICD-10-CM

## 2021-04-27 DIAGNOSIS — F17.210: ICD-10-CM

## 2021-04-27 DIAGNOSIS — I82.210: Primary | ICD-10-CM

## 2021-04-27 DIAGNOSIS — I82.290: ICD-10-CM

## 2021-04-27 DIAGNOSIS — C34.90: ICD-10-CM

## 2021-04-27 DIAGNOSIS — Z85.118: ICD-10-CM

## 2021-04-27 DIAGNOSIS — G62.9: ICD-10-CM

## 2021-04-27 DIAGNOSIS — E66.9: ICD-10-CM

## 2021-04-27 DIAGNOSIS — R07.89: ICD-10-CM

## 2021-04-27 DIAGNOSIS — Z92.3: ICD-10-CM

## 2021-04-27 DIAGNOSIS — J43.9: ICD-10-CM

## 2021-04-27 DIAGNOSIS — D64.9: ICD-10-CM

## 2021-04-27 DIAGNOSIS — I10: ICD-10-CM

## 2021-04-27 DIAGNOSIS — R22.1: ICD-10-CM

## 2021-04-27 DIAGNOSIS — C53.9: ICD-10-CM

## 2021-04-27 DIAGNOSIS — G90.8: ICD-10-CM

## 2021-04-27 DIAGNOSIS — K21.9: ICD-10-CM

## 2021-04-27 DIAGNOSIS — J96.00: ICD-10-CM

## 2021-04-27 DIAGNOSIS — Z87.01: ICD-10-CM

## 2021-04-27 DIAGNOSIS — Z71.6: ICD-10-CM

## 2021-04-27 DIAGNOSIS — E78.5: ICD-10-CM

## 2021-04-27 DIAGNOSIS — Z85.41: ICD-10-CM

## 2021-04-27 DIAGNOSIS — I82.C11: ICD-10-CM

## 2021-04-27 DIAGNOSIS — Z20.822: ICD-10-CM

## 2021-04-27 DIAGNOSIS — Z92.21: ICD-10-CM

## 2021-04-27 LAB
BASOPHILS NFR BLD AUTO: 0.4 % (ref 0–2)
CHLORIDE SERPL-SCNC: 110 MEQ/L (ref 98–107)
EOSINOPHIL NFR BLD AUTO: 1.7 % (ref 0–5)
ERYTHROCYTE [DISTWIDTH] IN BLOOD BY AUTOMATED COUNT: 14.2 % (ref 11.6–14.6)
HCT VFR BLD AUTO: 30.4 % (ref 36–48)
HGB BLD-MCNC: 10.4 G/DL (ref 12–16)
LYMPHOCYTES NFR BLD AUTO: 28.2 % (ref 20–50)
MCH RBC QN AUTO: 33.7 PG (ref 28–32)
MCV RBC AUTO: 98.3 FL (ref 81–99)
MONOCYTES NFR BLD AUTO: 11.5 % (ref 2–8)
NEUTROPHILS NFR BLD AUTO: 58.2 % (ref 40–76)
PLATELET # BLD AUTO: 275 X1000/UL (ref 130–400)
PMV BLD AUTO: 7.3 FL (ref 7.4–10.4)
RBC # BLD AUTO: 3.09 MILL/UL (ref 4.2–5.4)

## 2021-04-27 PROCEDURE — 81003 URINALYSIS AUTO W/O SCOPE: CPT

## 2021-04-27 PROCEDURE — 85025 COMPLETE CBC W/AUTO DIFF WBC: CPT

## 2021-04-27 PROCEDURE — 80048 BASIC METABOLIC PNL TOTAL CA: CPT

## 2021-04-27 PROCEDURE — 84443 ASSAY THYROID STIM HORMONE: CPT

## 2021-04-27 PROCEDURE — 71275 CT ANGIOGRAPHY CHEST: CPT

## 2021-04-27 PROCEDURE — 70490 CT SOFT TISSUE NECK W/O DYE: CPT

## 2021-04-27 PROCEDURE — 99285 EMERGENCY DEPT VISIT HI MDM: CPT

## 2021-04-27 PROCEDURE — 94640 AIRWAY INHALATION TREATMENT: CPT

## 2021-04-27 PROCEDURE — 85347 COAGULATION TIME ACTIVATED: CPT

## 2021-04-27 PROCEDURE — 83880 ASSAY OF NATRIURETIC PEPTIDE: CPT

## 2021-04-27 PROCEDURE — 93880 EXTRACRANIAL BILAT STUDY: CPT

## 2021-04-27 PROCEDURE — 87430 STREP A AG IA: CPT

## 2021-04-27 PROCEDURE — 93005 ELECTROCARDIOGRAM TRACING: CPT

## 2021-04-27 PROCEDURE — 36415 COLL VENOUS BLD VENIPUNCTURE: CPT

## 2021-04-27 PROCEDURE — 87426 SARSCOV CORONAVIRUS AG IA: CPT

## 2021-04-27 PROCEDURE — 84484 ASSAY OF TROPONIN QUANT: CPT

## 2021-04-27 PROCEDURE — 87070 CULTURE OTHR SPECIMN AEROBIC: CPT

## 2021-04-27 PROCEDURE — 37248 TRLUML BALO ANGIOP 1ST VEIN: CPT

## 2021-04-27 PROCEDURE — 75827 VEIN X-RAY CHEST: CPT

## 2021-04-27 PROCEDURE — 83605 ASSAY OF LACTIC ACID: CPT

## 2021-04-27 PROCEDURE — 93306 TTE W/DOPPLER COMPLETE: CPT

## 2021-04-27 PROCEDURE — 80053 COMPREHEN METABOLIC PANEL: CPT

## 2021-04-27 PROCEDURE — 71045 X-RAY EXAM CHEST 1 VIEW: CPT

## 2021-04-28 VITALS — SYSTOLIC BLOOD PRESSURE: 127 MMHG | DIASTOLIC BLOOD PRESSURE: 72 MMHG

## 2021-04-28 VITALS — DIASTOLIC BLOOD PRESSURE: 75 MMHG | SYSTOLIC BLOOD PRESSURE: 111 MMHG

## 2021-04-28 VITALS — SYSTOLIC BLOOD PRESSURE: 115 MMHG | DIASTOLIC BLOOD PRESSURE: 71 MMHG

## 2021-04-28 VITALS — SYSTOLIC BLOOD PRESSURE: 144 MMHG | DIASTOLIC BLOOD PRESSURE: 94 MMHG

## 2021-04-28 VITALS — DIASTOLIC BLOOD PRESSURE: 70 MMHG | SYSTOLIC BLOOD PRESSURE: 111 MMHG

## 2021-04-28 VITALS — SYSTOLIC BLOOD PRESSURE: 135 MMHG | DIASTOLIC BLOOD PRESSURE: 85 MMHG

## 2021-04-28 LAB
APPEARANCE UR: CLEAR
COLOR UR: YELLOW
HGB UR QL STRIP: NEGATIVE
KETONES UR STRIP-MCNC: NEGATIVE MG/DL
LEUKOCYTE ESTERASE UR QL STRIP: NEGATIVE
NITRITE UR QL STRIP: NEGATIVE
PH UR STRIP: 5.5 [PH] (ref 4.5–8)
PROT UR QL STRIP: NEGATIVE
SP GR UR STRIP: 1.08 (ref 1–1.03)
UROBILINOGEN UR STRIP-MCNC: 1 E.U./DL (ref 0.2–1)

## 2021-04-28 RX ADMIN — ZOLPIDEM TARTRATE PRN MG: 5 TABLET ORAL at 23:59

## 2021-04-28 RX ADMIN — NICOTINE SCH MG: 14 PATCH, EXTENDED RELEASE TRANSDERMAL at 13:40

## 2021-04-28 RX ADMIN — GABAPENTIN SCH MG: 100 CAPSULE ORAL at 21:01

## 2021-04-28 RX ADMIN — ATORVASTATIN CALCIUM SCH MG: 20 TABLET, FILM COATED ORAL at 21:01

## 2021-04-28 RX ADMIN — GABAPENTIN SCH MG: 100 CAPSULE ORAL at 15:37

## 2021-04-29 VITALS — DIASTOLIC BLOOD PRESSURE: 82 MMHG | SYSTOLIC BLOOD PRESSURE: 135 MMHG

## 2021-04-29 VITALS — SYSTOLIC BLOOD PRESSURE: 107 MMHG | DIASTOLIC BLOOD PRESSURE: 66 MMHG

## 2021-04-29 VITALS — SYSTOLIC BLOOD PRESSURE: 121 MMHG | DIASTOLIC BLOOD PRESSURE: 75 MMHG

## 2021-04-29 VITALS — DIASTOLIC BLOOD PRESSURE: 64 MMHG | SYSTOLIC BLOOD PRESSURE: 104 MMHG

## 2021-04-29 VITALS — SYSTOLIC BLOOD PRESSURE: 140 MMHG | DIASTOLIC BLOOD PRESSURE: 68 MMHG

## 2021-04-29 VITALS — SYSTOLIC BLOOD PRESSURE: 153 MMHG | DIASTOLIC BLOOD PRESSURE: 95 MMHG

## 2021-04-29 LAB
BASOPHILS NFR BLD AUTO: 0.4 % (ref 0–2)
CHLORIDE SERPL-SCNC: 110 MEQ/L (ref 98–107)
EOSINOPHIL NFR BLD AUTO: 2 % (ref 0–5)
ERYTHROCYTE [DISTWIDTH] IN BLOOD BY AUTOMATED COUNT: 14 % (ref 11.6–14.6)
HCT VFR BLD AUTO: 31.5 % (ref 36–48)
HGB BLD-MCNC: 10.3 G/DL (ref 12–16)
LYMPHOCYTES NFR BLD AUTO: 34.7 % (ref 20–50)
MCH RBC QN AUTO: 32.3 PG (ref 28–32)
MCV RBC AUTO: 98.5 FL (ref 81–99)
MONOCYTES NFR BLD AUTO: 13.8 % (ref 2–8)
NEUTROPHILS NFR BLD AUTO: 49.1 % (ref 40–76)
PLATELET # BLD AUTO: 283 X1000/UL (ref 130–400)
PMV BLD AUTO: 7.8 FL (ref 7.4–10.4)
RBC # BLD AUTO: 3.2 MILL/UL (ref 4.2–5.4)

## 2021-04-29 RX ADMIN — GABAPENTIN SCH MG: 100 CAPSULE ORAL at 21:00

## 2021-04-29 RX ADMIN — ENOXAPARIN SODIUM SCH MG: 30 INJECTION SUBCUTANEOUS at 20:49

## 2021-04-29 RX ADMIN — IPRATROPIUM BROMIDE AND ALBUTEROL SULFATE SCH ML: .5; 3 SOLUTION RESPIRATORY (INHALATION) at 03:00

## 2021-04-29 RX ADMIN — FLUTICASONE PROPIONATE SCH SPRAY: 50 SPRAY, METERED NASAL at 13:35

## 2021-04-29 RX ADMIN — GABAPENTIN SCH MG: 100 CAPSULE ORAL at 07:03

## 2021-04-29 RX ADMIN — IPRATROPIUM BROMIDE AND ALBUTEROL SULFATE SCH ML: .5; 3 SOLUTION RESPIRATORY (INHALATION) at 14:00

## 2021-04-29 RX ADMIN — BUDESONIDE SCH MG: 0.5 SUSPENSION RESPIRATORY (INHALATION) at 03:00

## 2021-04-29 RX ADMIN — BUDESONIDE SCH MG: 0.5 SUSPENSION RESPIRATORY (INHALATION) at 20:13

## 2021-04-29 RX ADMIN — IPRATROPIUM BROMIDE AND ALBUTEROL SULFATE SCH ML: .5; 3 SOLUTION RESPIRATORY (INHALATION) at 20:13

## 2021-04-29 RX ADMIN — FLUTICASONE PROPIONATE SCH SPRAY: 50 SPRAY, METERED NASAL at 20:48

## 2021-04-29 RX ADMIN — BUDESONIDE SCH MG: 0.5 SUSPENSION RESPIRATORY (INHALATION) at 08:46

## 2021-04-29 RX ADMIN — ZOLPIDEM TARTRATE PRN MG: 5 TABLET ORAL at 21:00

## 2021-04-29 RX ADMIN — ENOXAPARIN SODIUM SCH MG: 30 INJECTION SUBCUTANEOUS at 09:33

## 2021-04-29 RX ADMIN — IPRATROPIUM BROMIDE AND ALBUTEROL SULFATE SCH ML: .5; 3 SOLUTION RESPIRATORY (INHALATION) at 08:46

## 2021-04-29 RX ADMIN — GABAPENTIN SCH MG: 100 CAPSULE ORAL at 13:35

## 2021-04-29 RX ADMIN — ATORVASTATIN CALCIUM SCH MG: 20 TABLET, FILM COATED ORAL at 20:47

## 2021-04-29 RX ADMIN — NICOTINE SCH MG: 14 PATCH, EXTENDED RELEASE TRANSDERMAL at 09:33

## 2021-04-30 VITALS — SYSTOLIC BLOOD PRESSURE: 106 MMHG | DIASTOLIC BLOOD PRESSURE: 62 MMHG

## 2021-04-30 VITALS — DIASTOLIC BLOOD PRESSURE: 81 MMHG | SYSTOLIC BLOOD PRESSURE: 132 MMHG

## 2021-04-30 VITALS — SYSTOLIC BLOOD PRESSURE: 142 MMHG | DIASTOLIC BLOOD PRESSURE: 90 MMHG

## 2021-04-30 VITALS — DIASTOLIC BLOOD PRESSURE: 70 MMHG | SYSTOLIC BLOOD PRESSURE: 115 MMHG

## 2021-04-30 PROCEDURE — 057M3ZZ DILATION OF RIGHT INTERNAL JUGULAR VEIN, PERCUTANEOUS APPROACH: ICD-10-PCS | Performed by: SURGERY

## 2021-04-30 PROCEDURE — B5131ZZ FLUOROSCOPY OF RIGHT JUGULAR VEINS USING LOW OSMOLAR CONTRAST: ICD-10-PCS | Performed by: SURGERY

## 2021-04-30 PROCEDURE — B543ZZA ULTRASONOGRAPHY OF RIGHT JUGULAR VEINS, GUIDANCE: ICD-10-PCS | Performed by: SURGERY

## 2021-04-30 PROCEDURE — 027V3ZZ DILATION OF SUPERIOR VENA CAVA, PERCUTANEOUS APPROACH: ICD-10-PCS | Performed by: SURGERY

## 2021-04-30 PROCEDURE — 05733ZZ DILATION OF RIGHT INNOMINATE VEIN, PERCUTANEOUS APPROACH: ICD-10-PCS | Performed by: SURGERY

## 2021-04-30 RX ADMIN — BUDESONIDE SCH MG: 0.5 SUSPENSION RESPIRATORY (INHALATION) at 07:11

## 2021-04-30 RX ADMIN — ENOXAPARIN SODIUM SCH MG: 30 INJECTION SUBCUTANEOUS at 09:53

## 2021-04-30 RX ADMIN — NICOTINE SCH MG: 14 PATCH, EXTENDED RELEASE TRANSDERMAL at 09:53

## 2021-04-30 RX ADMIN — GABAPENTIN SCH MG: 100 CAPSULE ORAL at 13:33

## 2021-04-30 RX ADMIN — IPRATROPIUM BROMIDE AND ALBUTEROL SULFATE SCH ML: .5; 3 SOLUTION RESPIRATORY (INHALATION) at 02:07

## 2021-04-30 RX ADMIN — FLUTICASONE PROPIONATE SCH SPRAY: 50 SPRAY, METERED NASAL at 09:54

## 2021-04-30 RX ADMIN — GABAPENTIN SCH MG: 100 CAPSULE ORAL at 06:00

## 2021-04-30 RX ADMIN — IPRATROPIUM BROMIDE AND ALBUTEROL SULFATE SCH ML: .5; 3 SOLUTION RESPIRATORY (INHALATION) at 07:12

## 2022-03-14 ENCOUNTER — HOSPITAL ENCOUNTER (INPATIENT)
Dept: HOSPITAL 87 - ER | Age: 57
LOS: 2 days | Discharge: HOME | DRG: 203 | End: 2022-03-16
Attending: INTERNAL MEDICINE | Admitting: INTERNAL MEDICINE
Payer: MEDICAID

## 2022-03-14 VITALS — SYSTOLIC BLOOD PRESSURE: 117 MMHG | DIASTOLIC BLOOD PRESSURE: 60 MMHG

## 2022-03-14 VITALS — DIASTOLIC BLOOD PRESSURE: 60 MMHG | SYSTOLIC BLOOD PRESSURE: 117 MMHG

## 2022-03-14 VITALS — WEIGHT: 213.37 LBS | BODY MASS INDEX: 35.55 KG/M2 | HEIGHT: 65 IN

## 2022-03-14 DIAGNOSIS — Z71.3: ICD-10-CM

## 2022-03-14 DIAGNOSIS — Z91.19: ICD-10-CM

## 2022-03-14 DIAGNOSIS — E66.9: ICD-10-CM

## 2022-03-14 DIAGNOSIS — M94.0: Primary | ICD-10-CM

## 2022-03-14 DIAGNOSIS — Z86.711: ICD-10-CM

## 2022-03-14 DIAGNOSIS — F17.210: ICD-10-CM

## 2022-03-14 DIAGNOSIS — Z71.6: ICD-10-CM

## 2022-03-14 DIAGNOSIS — I10: ICD-10-CM

## 2022-03-14 DIAGNOSIS — Z79.1: ICD-10-CM

## 2022-03-14 DIAGNOSIS — C34.90: ICD-10-CM

## 2022-03-14 DIAGNOSIS — Z87.01: ICD-10-CM

## 2022-03-14 DIAGNOSIS — E88.09: ICD-10-CM

## 2022-03-14 DIAGNOSIS — Z86.718: ICD-10-CM

## 2022-03-14 DIAGNOSIS — J44.9: ICD-10-CM

## 2022-03-14 DIAGNOSIS — Z90.710: ICD-10-CM

## 2022-03-14 DIAGNOSIS — Z20.822: ICD-10-CM

## 2022-03-14 DIAGNOSIS — Z79.899: ICD-10-CM

## 2022-03-14 LAB
BASOPHILS NFR BLD AUTO: 0.4 % (ref 0–2)
CHLORIDE SERPL-SCNC: 108 MEQ/L (ref 98–107)
EOSINOPHIL NFR BLD AUTO: 1.9 % (ref 0–5)
ERYTHROCYTE [DISTWIDTH] IN BLOOD BY AUTOMATED COUNT: 16.2 % (ref 11.6–14.6)
HCT VFR BLD AUTO: 38.6 % (ref 36–48)
HGB BLD-MCNC: 13.2 G/DL (ref 12–16)
LYMPHOCYTES NFR BLD AUTO: 34.6 % (ref 20–50)
MCH RBC QN AUTO: 32.8 PG (ref 28–32)
MCV RBC AUTO: 95.9 FL (ref 81–99)
MONOCYTES NFR BLD AUTO: 10.3 % (ref 2–8)
NEUTROPHILS NFR BLD AUTO: 52.8 % (ref 40–76)
PLATELET # BLD AUTO: 300 X1000/UL (ref 130–400)
PMV BLD AUTO: 7.6 FL (ref 7.4–10.4)
RBC # BLD AUTO: 4.02 MILL/UL (ref 4.2–5.4)

## 2022-03-14 PROCEDURE — 85025 COMPLETE CBC W/AUTO DIFF WBC: CPT

## 2022-03-14 PROCEDURE — 94640 AIRWAY INHALATION TREATMENT: CPT

## 2022-03-14 PROCEDURE — 71045 X-RAY EXAM CHEST 1 VIEW: CPT

## 2022-03-14 PROCEDURE — 85379 FIBRIN DEGRADATION QUANT: CPT

## 2022-03-14 PROCEDURE — 83605 ASSAY OF LACTIC ACID: CPT

## 2022-03-14 PROCEDURE — 99285 EMERGENCY DEPT VISIT HI MDM: CPT

## 2022-03-14 PROCEDURE — 93306 TTE W/DOPPLER COMPLETE: CPT

## 2022-03-14 PROCEDURE — 93005 ELECTROCARDIOGRAM TRACING: CPT

## 2022-03-14 PROCEDURE — 90686 IIV4 VACC NO PRSV 0.5 ML IM: CPT

## 2022-03-14 PROCEDURE — 71275 CT ANGIOGRAPHY CHEST: CPT

## 2022-03-14 PROCEDURE — 84484 ASSAY OF TROPONIN QUANT: CPT

## 2022-03-14 PROCEDURE — 83880 ASSAY OF NATRIURETIC PEPTIDE: CPT

## 2022-03-14 PROCEDURE — 36415 COLL VENOUS BLD VENIPUNCTURE: CPT

## 2022-03-14 PROCEDURE — 80053 COMPREHEN METABOLIC PANEL: CPT

## 2022-03-14 PROCEDURE — 87426 SARSCOV CORONAVIRUS AG IA: CPT

## 2022-03-14 RX ADMIN — ALBUTEROL SULFATE SCH MG: 2.5 SOLUTION RESPIRATORY (INHALATION) at 12:53

## 2022-03-14 RX ADMIN — ALBUTEROL SULFATE SCH MG: 2.5 SOLUTION RESPIRATORY (INHALATION) at 13:28

## 2022-03-14 RX ADMIN — ALBUTEROL SULFATE SCH MG: 2.5 SOLUTION RESPIRATORY (INHALATION) at 12:21

## 2022-03-14 RX ADMIN — IPRATROPIUM BROMIDE AND ALBUTEROL SULFATE SCH ML: .5; 3 SOLUTION RESPIRATORY (INHALATION) at 17:52

## 2022-03-14 RX ADMIN — ZOLPIDEM TARTRATE PRN MG: 5 TABLET ORAL at 23:23

## 2022-03-14 RX ADMIN — HYDROCODONE BITARTRATE AND ACETAMINOPHEN PRN TAB: 10; 325 TABLET ORAL at 22:00

## 2022-03-15 VITALS — SYSTOLIC BLOOD PRESSURE: 109 MMHG | DIASTOLIC BLOOD PRESSURE: 77 MMHG

## 2022-03-15 VITALS — SYSTOLIC BLOOD PRESSURE: 122 MMHG | DIASTOLIC BLOOD PRESSURE: 74 MMHG

## 2022-03-15 VITALS — DIASTOLIC BLOOD PRESSURE: 78 MMHG | SYSTOLIC BLOOD PRESSURE: 116 MMHG

## 2022-03-15 VITALS — DIASTOLIC BLOOD PRESSURE: 86 MMHG | SYSTOLIC BLOOD PRESSURE: 138 MMHG

## 2022-03-15 VITALS — DIASTOLIC BLOOD PRESSURE: 76 MMHG | SYSTOLIC BLOOD PRESSURE: 125 MMHG

## 2022-03-15 RX ADMIN — IPRATROPIUM BROMIDE AND ALBUTEROL SULFATE SCH ML: .5; 3 SOLUTION RESPIRATORY (INHALATION) at 07:57

## 2022-03-15 RX ADMIN — DOCUSATE SODIUM SCH MG: 250 CAPSULE, LIQUID FILLED ORAL at 17:48

## 2022-03-15 RX ADMIN — IPRATROPIUM BROMIDE AND ALBUTEROL SULFATE SCH ML: .5; 3 SOLUTION RESPIRATORY (INHALATION) at 15:40

## 2022-03-15 RX ADMIN — DOCUSATE SODIUM SCH MG: 250 CAPSULE, LIQUID FILLED ORAL at 08:32

## 2022-03-15 RX ADMIN — HYDROCODONE BITARTRATE AND ACETAMINOPHEN PRN TAB: 10; 325 TABLET ORAL at 21:49

## 2022-03-15 RX ADMIN — ZOLPIDEM TARTRATE PRN MG: 5 TABLET ORAL at 23:05

## 2022-03-15 RX ADMIN — HYDROCODONE BITARTRATE AND ACETAMINOPHEN PRN TAB: 10; 325 TABLET ORAL at 08:32

## 2022-03-15 RX ADMIN — IPRATROPIUM BROMIDE AND ALBUTEROL SULFATE SCH ML: .5; 3 SOLUTION RESPIRATORY (INHALATION) at 11:38

## 2022-03-15 RX ADMIN — IPRATROPIUM BROMIDE AND ALBUTEROL SULFATE SCH ML: .5; 3 SOLUTION RESPIRATORY (INHALATION) at 21:07

## 2022-03-16 VITALS — DIASTOLIC BLOOD PRESSURE: 79 MMHG | SYSTOLIC BLOOD PRESSURE: 125 MMHG

## 2022-03-16 VITALS — SYSTOLIC BLOOD PRESSURE: 98 MMHG | DIASTOLIC BLOOD PRESSURE: 55 MMHG

## 2022-03-16 VITALS — DIASTOLIC BLOOD PRESSURE: 61 MMHG | SYSTOLIC BLOOD PRESSURE: 121 MMHG

## 2022-03-16 VITALS — SYSTOLIC BLOOD PRESSURE: 125 MMHG | DIASTOLIC BLOOD PRESSURE: 79 MMHG

## 2022-03-16 RX ADMIN — IPRATROPIUM BROMIDE AND ALBUTEROL SULFATE SCH ML: .5; 3 SOLUTION RESPIRATORY (INHALATION) at 09:00

## 2022-03-16 RX ADMIN — IPRATROPIUM BROMIDE AND ALBUTEROL SULFATE SCH ML: .5; 3 SOLUTION RESPIRATORY (INHALATION) at 00:31

## 2022-03-16 RX ADMIN — IPRATROPIUM BROMIDE AND ALBUTEROL SULFATE SCH ML: .5; 3 SOLUTION RESPIRATORY (INHALATION) at 04:11

## 2022-09-01 ENCOUNTER — HOSPITAL ENCOUNTER (INPATIENT)
Dept: HOSPITAL 87 - ER | Age: 57
LOS: 1 days | Discharge: HOME | DRG: 197 | End: 2022-09-02
Attending: HOSPITALIST | Admitting: HOSPITALIST
Payer: MEDICAID

## 2022-09-01 VITALS — HEIGHT: 65 IN | BODY MASS INDEX: 36.65 KG/M2 | WEIGHT: 220 LBS

## 2022-09-01 DIAGNOSIS — D64.9: ICD-10-CM

## 2022-09-01 DIAGNOSIS — Z90.710: ICD-10-CM

## 2022-09-01 DIAGNOSIS — Z91.14: ICD-10-CM

## 2022-09-01 DIAGNOSIS — Z86.718: ICD-10-CM

## 2022-09-01 DIAGNOSIS — Z85.41: ICD-10-CM

## 2022-09-01 DIAGNOSIS — Z87.01: ICD-10-CM

## 2022-09-01 DIAGNOSIS — Z82.49: ICD-10-CM

## 2022-09-01 DIAGNOSIS — J44.9: ICD-10-CM

## 2022-09-01 DIAGNOSIS — R07.9: ICD-10-CM

## 2022-09-01 DIAGNOSIS — I82.210: Primary | ICD-10-CM

## 2022-09-01 DIAGNOSIS — Z79.01: ICD-10-CM

## 2022-09-01 DIAGNOSIS — I77.9: ICD-10-CM

## 2022-09-01 DIAGNOSIS — F17.200: ICD-10-CM

## 2022-09-01 DIAGNOSIS — E66.9: ICD-10-CM

## 2022-09-01 DIAGNOSIS — E78.5: ICD-10-CM

## 2022-09-01 DIAGNOSIS — C34.90: ICD-10-CM

## 2022-09-01 LAB
BASOPHILS NFR BLD AUTO: 0.6 % (ref 0–2)
CHLORIDE SERPL-SCNC: 110 MEQ/L (ref 98–107)
EOSINOPHIL NFR BLD AUTO: 2 % (ref 0–5)
ERYTHROCYTE [DISTWIDTH] IN BLOOD BY AUTOMATED COUNT: 15.1 % (ref 11.6–14.6)
ETHANOL SERPL-MCNC: < 10 MG/DL
HCT VFR BLD AUTO: 34 % (ref 36–48)
HGB BLD-MCNC: 11.2 G/DL (ref 12–16)
INR PPP: 1
LYMPHOCYTES NFR BLD AUTO: 36.9 % (ref 20–50)
MCH RBC QN AUTO: 32.9 PG (ref 28–32)
MCV RBC AUTO: 99.5 FL (ref 81–99)
MONOCYTES NFR BLD AUTO: 7.5 % (ref 2–8)
NEUTROPHILS NFR BLD AUTO: 53 % (ref 40–76)
PLATELET # BLD AUTO: 246 X1000/UL (ref 130–400)
PMV BLD AUTO: 7.8 FL (ref 7.4–10.4)
PROTHROMBIN TIME: 11.1 SEC (ref 9.6–11)
RBC # BLD AUTO: 3.41 MILL/UL (ref 4.2–5.4)

## 2022-09-01 PROCEDURE — 80053 COMPREHEN METABOLIC PANEL: CPT

## 2022-09-01 PROCEDURE — 80320 DRUG SCREEN QUANTALCOHOLS: CPT

## 2022-09-01 PROCEDURE — 99291 CRITICAL CARE FIRST HOUR: CPT

## 2022-09-01 PROCEDURE — 71045 X-RAY EXAM CHEST 1 VIEW: CPT

## 2022-09-01 PROCEDURE — 36415 COLL VENOUS BLD VENIPUNCTURE: CPT

## 2022-09-01 PROCEDURE — 85025 COMPLETE CBC W/AUTO DIFF WBC: CPT

## 2022-09-01 PROCEDURE — 80061 LIPID PANEL: CPT

## 2022-09-01 PROCEDURE — G0480 DRUG TEST DEF 1-7 CLASSES: HCPCS

## 2022-09-01 PROCEDURE — 70496 CT ANGIOGRAPHY HEAD: CPT

## 2022-09-01 PROCEDURE — 93005 ELECTROCARDIOGRAM TRACING: CPT

## 2022-09-01 PROCEDURE — 70498 CT ANGIOGRAPHY NECK: CPT

## 2022-09-02 VITALS — SYSTOLIC BLOOD PRESSURE: 111 MMHG | DIASTOLIC BLOOD PRESSURE: 74 MMHG

## 2022-09-02 VITALS — DIASTOLIC BLOOD PRESSURE: 82 MMHG | SYSTOLIC BLOOD PRESSURE: 121 MMHG

## 2022-09-02 LAB
BASOPHILS NFR BLD AUTO: 0.5 % (ref 0–2)
CHLORIDE SERPL-SCNC: 108 MEQ/L (ref 98–107)
EOSINOPHIL NFR BLD AUTO: 2.3 % (ref 0–5)
ERYTHROCYTE [DISTWIDTH] IN BLOOD BY AUTOMATED COUNT: 15.3 % (ref 11.6–14.6)
HCT VFR BLD AUTO: 37.2 % (ref 36–48)
HDLC SERPL-MCNC: 40 MG/DL (ref 40–59)
HGB BLD-MCNC: 12.1 G/DL (ref 12–16)
LDLC SERPL DIRECT ASSAY-MCNC: 142 MG/DL (ref 5–100)
LYMPHOCYTES NFR BLD AUTO: 39.4 % (ref 20–50)
MCH RBC QN AUTO: 32.9 PG (ref 28–32)
MCV RBC AUTO: 100.9 FL (ref 81–99)
MONOCYTES NFR BLD AUTO: 8.7 % (ref 2–8)
NEUTROPHILS NFR BLD AUTO: 49.1 % (ref 40–76)
PLATELET # BLD AUTO: 254 X1000/UL (ref 130–400)
PMV BLD AUTO: 8.3 FL (ref 7.4–10.4)
RBC # BLD AUTO: 3.69 MILL/UL (ref 4.2–5.4)

## 2022-09-02 RX ADMIN — CLOPIDOGREL BISULFATE SCH MG: 75 TABLET, FILM COATED ORAL at 09:57

## 2022-09-02 RX ADMIN — CLOPIDOGREL BISULFATE SCH MG: 75 TABLET, FILM COATED ORAL at 00:45

## 2023-01-29 ENCOUNTER — HOSPITAL ENCOUNTER (EMERGENCY)
Dept: HOSPITAL 87 - ER | Age: 58
Discharge: HOME | End: 2023-01-29
Payer: MEDICARE

## 2023-01-29 VITALS — HEIGHT: 65 IN | WEIGHT: 205.03 LBS | BODY MASS INDEX: 34.16 KG/M2

## 2023-01-29 VITALS — SYSTOLIC BLOOD PRESSURE: 107 MMHG | DIASTOLIC BLOOD PRESSURE: 68 MMHG

## 2023-01-29 DIAGNOSIS — Z79.899: ICD-10-CM

## 2023-01-29 DIAGNOSIS — Z87.01: ICD-10-CM

## 2023-01-29 DIAGNOSIS — L03.211: Primary | ICD-10-CM

## 2023-01-29 DIAGNOSIS — Z90.710: ICD-10-CM

## 2023-01-29 DIAGNOSIS — J44.9: ICD-10-CM

## 2023-01-29 LAB
APPEARANCE UR: (no result)
BASOPHILS NFR BLD AUTO: 0.5 % (ref 0–2)
CHLORIDE SERPL-SCNC: 108 MEQ/L (ref 98–107)
COLOR UR: YELLOW
EOSINOPHIL NFR BLD AUTO: 1.8 % (ref 0–5)
ERYTHROCYTE [DISTWIDTH] IN BLOOD BY AUTOMATED COUNT: 15.4 % (ref 11.6–14.6)
HCT VFR BLD AUTO: 35.5 % (ref 36–48)
HGB BLD-MCNC: 11.8 G/DL (ref 12–16)
HGB UR QL STRIP: NEGATIVE
INR PPP: 1.1
KETONES UR STRIP-MCNC: NEGATIVE MG/DL
LEUKOCYTE ESTERASE UR QL STRIP: (no result)
LYMPHOCYTES NFR BLD AUTO: 28.7 % (ref 20–50)
MCH RBC QN AUTO: 32.2 PG (ref 28–32)
MCV RBC AUTO: 97.2 FL (ref 81–99)
MONOCYTES NFR BLD AUTO: 7.5 % (ref 2–8)
NEUTROPHILS NFR BLD AUTO: 61.5 % (ref 40–76)
NITRITE UR QL STRIP: NEGATIVE
PH UR STRIP: 5.5 [PH] (ref 4.5–8)
PLATELET # BLD AUTO: 288 X1000/UL (ref 130–400)
PMV BLD AUTO: 7.6 FL (ref 7.4–10.4)
PROT UR QL STRIP: NEGATIVE
PROTHROMBIN TIME: 11.4 SEC (ref 9.6–11)
RBC # BLD AUTO: 3.66 MILL/UL (ref 4.2–5.4)
SP GR UR STRIP: 1.02 (ref 1–1.03)
UROBILINOGEN UR STRIP-MCNC: 1 E.U./DL (ref 0.2–1)

## 2023-01-29 PROCEDURE — 83605 ASSAY OF LACTIC ACID: CPT

## 2023-01-29 PROCEDURE — 84145 PROCALCITONIN (PCT): CPT

## 2023-01-29 PROCEDURE — 85025 COMPLETE CBC W/AUTO DIFF WBC: CPT

## 2023-01-29 PROCEDURE — 36415 COLL VENOUS BLD VENIPUNCTURE: CPT

## 2023-01-29 PROCEDURE — 85610 PROTHROMBIN TIME: CPT

## 2023-01-29 PROCEDURE — 80053 COMPREHEN METABOLIC PANEL: CPT

## 2023-01-29 PROCEDURE — 96365 THER/PROPH/DIAG IV INF INIT: CPT

## 2023-01-29 PROCEDURE — 83880 ASSAY OF NATRIURETIC PEPTIDE: CPT

## 2023-01-29 PROCEDURE — 81003 URINALYSIS AUTO W/O SCOPE: CPT

## 2023-01-29 PROCEDURE — 87040 BLOOD CULTURE FOR BACTERIA: CPT

## 2023-01-29 PROCEDURE — 99285 EMERGENCY DEPT VISIT HI MDM: CPT

## 2023-01-29 PROCEDURE — 87086 URINE CULTURE/COLONY COUNT: CPT

## 2023-01-29 PROCEDURE — 70487 CT MAXILLOFACIAL W/DYE: CPT

## 2025-07-19 ENCOUNTER — HOSPITAL ENCOUNTER (EMERGENCY)
Dept: HOSPITAL 87 - ER | Age: 60
LOS: 1 days | Discharge: HOME | End: 2025-07-20
Payer: MEDICARE

## 2025-07-19 VITALS — HEIGHT: 66 IN | BODY MASS INDEX: 35.43 KG/M2 | WEIGHT: 220.46 LBS

## 2025-07-19 DIAGNOSIS — Z79.51: ICD-10-CM

## 2025-07-19 DIAGNOSIS — M25.561: Primary | ICD-10-CM

## 2025-07-19 DIAGNOSIS — Z79.82: ICD-10-CM

## 2025-07-19 DIAGNOSIS — Z79.899: ICD-10-CM

## 2025-07-19 DIAGNOSIS — Z79.1: ICD-10-CM

## 2025-07-19 DIAGNOSIS — J44.9: ICD-10-CM

## 2025-07-19 DIAGNOSIS — Z90.710: ICD-10-CM

## 2025-07-19 DIAGNOSIS — M17.11: ICD-10-CM

## 2025-07-19 DIAGNOSIS — Z79.02: ICD-10-CM

## 2025-07-19 PROCEDURE — 99283 EMERGENCY DEPT VISIT LOW MDM: CPT

## 2025-07-19 PROCEDURE — 96372 THER/PROPH/DIAG INJ SC/IM: CPT

## 2025-07-19 PROCEDURE — 73562 X-RAY EXAM OF KNEE 3: CPT

## 2025-07-19 PROCEDURE — 29505 APPLICATION LONG LEG SPLINT: CPT

## 2025-07-20 VITALS
HEART RATE: 98 BPM | OXYGEN SATURATION: 100 % | DIASTOLIC BLOOD PRESSURE: 87 MMHG | RESPIRATION RATE: 18 BRPM | SYSTOLIC BLOOD PRESSURE: 137 MMHG

## 2025-07-20 VITALS — OXYGEN SATURATION: 98 % | TEMPERATURE: 98.6 F

## 2025-07-20 RX ADMIN — KETOROLAC TROMETHAMINE ONE MG: 15 INJECTION, SOLUTION INTRAMUSCULAR; INTRAVENOUS at 02:58
